# Patient Record
Sex: FEMALE | HISPANIC OR LATINO | Employment: UNEMPLOYED | URBAN - METROPOLITAN AREA
[De-identification: names, ages, dates, MRNs, and addresses within clinical notes are randomized per-mention and may not be internally consistent; named-entity substitution may affect disease eponyms.]

---

## 2018-04-04 ENCOUNTER — RECORDS - HEALTHEAST (OUTPATIENT)
Dept: LAB | Facility: HOSPITAL | Age: 28
End: 2018-04-04

## 2018-04-04 LAB
ALBUMIN SERPL-MCNC: 4.2 G/DL (ref 3.5–5)
ALP SERPL-CCNC: 50 U/L (ref 45–120)
ALT SERPL W P-5'-P-CCNC: 20 U/L (ref 0–45)
AST SERPL W P-5'-P-CCNC: 16 U/L (ref 0–40)
BILIRUB DIRECT SERPL-MCNC: 0.2 MG/DL
BILIRUB SERPL-MCNC: 0.5 MG/DL (ref 0–1)
PROT SERPL-MCNC: 8 G/DL (ref 6–8)

## 2019-03-26 ENCOUNTER — AMBULATORY - HEALTHEAST (OUTPATIENT)
Dept: ADMINISTRATIVE | Facility: CLINIC | Age: 29
End: 2019-03-26

## 2019-03-26 DIAGNOSIS — R09.81 NASAL CONGESTION: ICD-10-CM

## 2019-04-10 ENCOUNTER — OFFICE VISIT - HEALTHEAST (OUTPATIENT)
Dept: ALLERGY | Facility: CLINIC | Age: 29
End: 2019-04-10

## 2019-04-10 DIAGNOSIS — J30.89 NON-SEASONAL ALLERGIC RHINITIS DUE TO OTHER ALLERGIC TRIGGER: ICD-10-CM

## 2019-04-10 RX ORDER — HYDROXYZINE HYDROCHLORIDE 25 MG/1
25-50 TABLET, FILM COATED ORAL
Status: SHIPPED | COMMUNITY
Start: 2019-03-20

## 2019-04-10 RX ORDER — SUMATRIPTAN 25 MG/1
25 TABLET, FILM COATED ORAL
Status: SHIPPED | COMMUNITY
Start: 2019-04-10

## 2019-04-10 ASSESSMENT — MIFFLIN-ST. JEOR: SCORE: 1349.69

## 2019-04-17 ENCOUNTER — COMMUNICATION - HEALTHEAST (OUTPATIENT)
Dept: ALLERGY | Facility: CLINIC | Age: 29
End: 2019-04-17

## 2019-04-18 ENCOUNTER — RECORDS - HEALTHEAST (OUTPATIENT)
Dept: ADMINISTRATIVE | Facility: OTHER | Age: 29
End: 2019-04-18

## 2020-08-18 PROBLEM — G43.109 MIGRAINE WITH AURA: Status: ACTIVE | Noted: 2020-08-18

## 2020-08-18 PROBLEM — F41.1 GENERALIZED ANXIETY DISORDER: Status: ACTIVE | Noted: 2020-01-12

## 2021-05-27 NOTE — TELEPHONE ENCOUNTER
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 She is having to miss work, and she can't sleep. She is wondering what can be done about this. Would you be able to give her a call back at [phone]? Thank you.

## 2021-05-27 NOTE — PROGRESS NOTES
Assessment:    Allergic rhinitis with specific sensitivity to dust mite    Plan:    Dust mite avoidance measures  Fluticasone 2 sprays each nostril daily  Antihistamine such as Claritin or Benadryl as needed  Follow-up in allergy clinic as needed.  ____________________________________________________________________________     Patient comes in today for allergy evaluation.  She reports sneezing, itchy watery eyes, rhinorrhea-and and sometimes it affects her hearing.  She does get migraine headaches and she feels that her allergies trigger this.  Her symptoms are year-round but worse seasonally in the spring and fall.  She is also suspicious of dogs as a trigger.  She is trialed Claritin and cetirizine but they do not seem to help very much.  She uses Benadryl at night occasionally.  No history of asthma or wheezing.  She does have a history of eczema.    Review of symptoms:  As above, otherwise negative    Past medical history: Migraine headaches, anxiety    Allergies: No known allergies to  latex, foods or hymenoptera venom.  Reported drug allergy to ciprofloxacin, codeine and oxycodone.    Family history: Siblings and son with asthma history.  No known member the family with allergy.    Social history: Currently is lived in the same house for 3 years.  No pets in the home.  No visible water seepage or mold in the home.  She does smoke cigarettes off and on.    Medications: reviewed in chart    Physical Exam:  General:  Alert and in no apparent distress.  Eyes:  Sclera clear.  Ears: TMs translucent grey with bony landmarks visible. Nose: Pale, boggy mucosal membranes.  Throat: Pink, moist.  No lesions.  Neck: Supple.  No lymphadenopathy.  Lungs: CTA.  CV: Regular rate and rhythm. Extremities: Well perfused.  No clubbing or cyanosis. Skin: No rash    Last Percutaneous Allergy Test Results  Trees  Fransico, White  1:20 H  (W/F in mm): 0 (04/10/19 1112)  Birch Mix 1:20 H (W/F in mm): 0 (04/10/19 1112)  [place], Common  1:20 H (W/F in mm): 0 (04/10/19 1112)  Elm, American 1:20 H (W/F in mm): 0 (04/10/19 1112)  [place], Shagbark 1:20 H (W/F in mm): 0 (04/10/19 1112)  Maple, Hard/Sugar 1:20 H (W/F in mm): 0 (04/10/19 1112)  [place] Mix 1:20 H (W/F in mm): 0 (04/10/19 1112)  [place], Red 1:20 H (W/F in mm): 0 (04/10/19 1112)  [place], American 1:20 H (W/F in mm): 0 (04/10/19 1112)  [place] Tree 1:20 H (W/F in mm): 0 (04/10/19 1112)  Dust Mites  D. Pteronyssinus Mite 30,000 AU/ML H (W/F in mm): 7/9 (04/10/19 1112)  D. Farinae Mite 30,000 AU/ML H (W/F in mm: 5/7 (04/10/19 1112)  Grasses  Grass Mix #4 10,000 BAU/ML H: 0 (04/10/19 1112)  Ibrahima Grass 1:20 H (W/F in mm): 0 (04/10/19 1112)  Cockroach  Cockroach Mix 1:10 H (W/F in mm): 0 (04/10/19 1112)  Molds/Fungi  Alternaria Tenuis 1:10 H (W/F in mm): 0 (04/10/19 1112)  Aspergillus Fumigatus 1:10 H (W/F in mm): 0 (04/10/19 1112)  Homodendrum Cladosporioides 1:10 H (W/F in mm): 0 (04/10/19 1112)  Penicillin Notatum 1:10 H (W/F in mm): 0 (04/10/19 1112)  Epicoccum 1:10 H (W/F in mm): 0 (04/10/19 1112)  Weeds  Ragweed, Short 1:20 H (W/F in mm): 0 (04/10/19 1112)  Dock, Sorrel 1:20 H (W/F in mm): 0 (04/10/19 1112)  Lamb's Quarter 1:20 H (W/F in mm): 0 (04/10/19 1112)  Pigweed, Rough Red Root 1:20 H  (W/F in mm): 0 (04/10/19 1112)  Plantain, English 1:20 H  (W/F in mm): 0 (04/10/19 1112)  Sagebrush, Mugwort 1:20 H  (W/F in mm): 0 (04/10/19 1112)  Animal  Cat 10,000 BAU/ML H (W/F in mm): 0 (04/10/19 1112)  Dog 1:10 H (W/F in mm): 0 (04/10/19 1112)  Controls  Device Type: QUINTIP (04/10/19 1112)  Neg. control: 50% Glycerine/Saline H (W/F in mm): 0/0 (04/10/19 1112)  Pos. control: Histamine 6mg/ML (W/F in mms): 5/9 (04/10/19 1112)     Patient seen upon request of Dr. Patel for allergy evaluation

## 2021-05-27 NOTE — PATIENT INSTRUCTIONS - HE
Assessment:    Allergic rhinitis with specific sensitivity to dust mite    Plan:    Dust mite avoidance measures  Fluticasone 2 sprays each nostril daily  Antihistamine such as Claritin or Benadryl as needed  Follow-up in allergy clinic as needed.

## 2021-06-02 VITALS — HEIGHT: 65 IN | WEIGHT: 138.4 LBS | BODY MASS INDEX: 23.06 KG/M2

## 2021-06-19 NOTE — LETTER
Letter by Zia Rodriguez MD at      Author: Zia Rodriguez MD Service: -- Author Type: --    Filed:  Encounter Date: 4/10/2019 Status: (Other)           [facility]    [facility]  [address] [place] [address]  [phone]    [facility]  [address], [address]  [place] [address]  [phone]    04/11/19    Provider, No Primary Care  No address on file    Patient: Pilo Jaeger  MR Number: [id number]  YOB: 1990  Date of Visit: 4/10/2019      Dear Dr. Patel  Thank you for referring Pilo Jaeger to me for evaluation.  Please see attached visit note.  If you have questions, please do not hesitate to contact me.      Sincerely,    Zia Rodriguez MD  [facility]  [phone]  [email or web address]                    [email or web address]              Assessment:    Allergic rhinitis with specific sensitivity to dust mite    Plan:    Dust mite avoidance measures  Fluticasone 2 sprays each nostril daily  Antihistamine such as Claritin or Benadryl as needed  Follow-up in allergy clinic as needed.  ____________________________________________________________________________     Patient comes in today for allergy evaluation.  She reports sneezing, itchy watery eyes, rhinorrhea-and and sometimes it affects her hearing.  She does get migraine headaches and she feels that her allergies trigger this.  Her symptoms are year-round but worse seasonally in the spring and fall.  She is also suspicious of dogs as a trigger.  She is trialed Claritin and cetirizine but they do not seem to help very much.  She uses Benadryl at night occasionally.  No history of asthma or wheezing.  She does have a history of eczema.    Review of symptoms:  As above, otherwise negative    Past medical history: Migraine headaches, anxiety    Allergies: No known allergies to  latex, foods or hymenoptera venom.  Reported drug allergy to  ciprofloxacin, codeine and oxycodone.    Family history: Siblings and son with asthma history.  No known member the family with allergy.    Social history: Currently is lived in the same house for 3 years.  No pets in the home.  No visible water seepage or mold in the home.  She does smoke cigarettes off and on.    Medications: reviewed in chart    Physical Exam:  General:  Alert and in no apparent distress.  Eyes:  Sclera clear.  Ears: TMs translucent grey with bony landmarks visible. Nose: Pale, boggy mucosal membranes.  Throat: Pink, moist.  No lesions.  Neck: Supple.  No lymphadenopathy.  Lungs: CTA.  CV: Regular rate and rhythm. Extremities: Well perfused.  No clubbing or cyanosis. Skin: No rash    Last Percutaneous Allergy Test Results  Trees  Fransico, White  1:20 H  (W/F in mm): 0 (04/10/19 1112)  Birch Mix 1:20 H (W/F in mm): 0 (04/10/19 1112)  [place], Common 1:20 H (W/F in mm): 0 (04/10/19 1112)  Elm, American 1:20 H (W/F in mm): 0 (04/10/19 1112)  [place], Shagbark 1:20 H (W/F in mm): 0 (04/10/19 1112)  Maple, Hard/Sugar 1:20 H (W/F in mm): 0 (04/10/19 1112)  [place] Mix 1:20 H (W/F in mm): 0 (04/10/19 1112)  [place], Red 1:20 H (W/F in mm): 0 (04/10/19 1112)  [place], American 1:20 H (W/F in mm): 0 (04/10/19 1112)  [place] Tree 1:20 H (W/F in mm): 0 (04/10/19 1112)  Dust Mites  D. Pteronyssinus Mite 30,000 AU/ML H (W/F in mm): 7/9 (04/10/19 1112)  D. Farinae Mite 30,000 AU/ML H (W/F in mm: 5/7 (04/10/19 1112)  Grasses  Grass Mix #4 10,000 BAU/ML H: 0 (04/10/19 1112)  Ibrahima Grass 1:20 H (W/F in mm): 0 (04/10/19 1112)  Cockroach  Cockroach Mix 1:10 H (W/F in mm): 0 (04/10/19 1112)  Molds/Fungi  Alternaria Tenuis 1:10 H (W/F in mm): 0 (04/10/19 1112)  Aspergillus Fumigatus 1:10 H (W/F in mm): 0 (04/10/19 1112)  Homodendrum Cladosporioides 1:10 H (W/F in mm): 0 (04/10/19 1112)  Penicillin Notatum 1:10 H (W/F in mm): 0 (04/10/19 1112)  Epicoccum 1:10 H (W/F in mm): 0 (04/10/19 1112)  Weeds  Ragweed, Short 1:20  H (W/F in mm): 0 (04/10/19 1112)  Dock, Sorrel 1:20 H (W/F in mm): 0 (04/10/19 1112)  Lamb's Quarter 1:20 H (W/F in mm): 0 (04/10/19 1112)  Pigweed, Rough Red Root 1:20 H  (W/F in mm): 0 (04/10/19 1112)  Plantain, English 1:20 H  (W/F in mm): 0 (04/10/19 1112)  Sagebrush, Mugwort 1:20 H  (W/F in mm): 0 (04/10/19 1112)  Animal  Cat 10,000 BAU/ML H (W/F in mm): 0 (04/10/19 1112)  Dog 1:10 H (W/F in mm): 0 (04/10/19 1112)  Controls  Device Type: QUINTIP (04/10/19 1112)  Neg. control: 50% Glycerine/Saline H (W/F in mm): 0/0 (04/10/19 1112)  Pos. control: Histamine 6mg/ML (W/F in mms): 5/9 (04/10/19 1112)     Patient seen upon request of Dr. Patel for allergy evaluation

## 2021-09-20 ENCOUNTER — HOSPITAL ENCOUNTER (EMERGENCY)
Facility: HOSPITAL | Age: 31
Discharge: HOME | End: 2021-09-20
Attending: EMERGENCY MEDICINE | Admitting: EMERGENCY MEDICINE
Payer: COMMERCIAL

## 2021-09-20 VITALS
OXYGEN SATURATION: 100 % | TEMPERATURE: 97.8 F | HEART RATE: 79 BPM | HEIGHT: 65 IN | BODY MASS INDEX: 22.33 KG/M2 | RESPIRATION RATE: 20 BRPM | DIASTOLIC BLOOD PRESSURE: 93 MMHG | SYSTOLIC BLOOD PRESSURE: 129 MMHG | WEIGHT: 134 LBS

## 2021-09-20 DIAGNOSIS — N30.00 ACUTE CYSTITIS WITHOUT HEMATURIA: ICD-10-CM

## 2021-09-20 LAB
ALBUMIN UR-MCNC: 20 MG/DL
APPEARANCE UR: ABNORMAL
BILIRUB UR QL STRIP: NEGATIVE
COLOR UR AUTO: YELLOW
GLUCOSE UR STRIP-MCNC: NEGATIVE MG/DL
HCG UR QL: NEGATIVE
HCG UR QL: NEGATIVE
HGB UR QL STRIP: NEGATIVE
INTERNAL QC OK POCT: NORMAL
KETONES UR STRIP-MCNC: NEGATIVE MG/DL
LEUKOCYTE ESTERASE UR QL STRIP: ABNORMAL
MUCOUS THREADS #/AREA URNS LPF: PRESENT /LPF
NITRATE UR QL: NEGATIVE
PH UR STRIP: 7 [PH] (ref 5–7)
RBC URINE: <1 /HPF
SP GR UR STRIP: 1.02 (ref 1–1.03)
SQUAMOUS EPITHELIAL: <1 /HPF
UROBILINOGEN UR STRIP-MCNC: 2 MG/DL
WBC URINE: 77 /HPF

## 2021-09-20 PROCEDURE — 81025 URINE PREGNANCY TEST: CPT | Performed by: EMERGENCY MEDICINE

## 2021-09-20 PROCEDURE — 87086 URINE CULTURE/COLONY COUNT: CPT | Performed by: EMERGENCY MEDICINE

## 2021-09-20 PROCEDURE — 81001 URINALYSIS AUTO W/SCOPE: CPT | Performed by: EMERGENCY MEDICINE

## 2021-09-20 PROCEDURE — 99283 EMERGENCY DEPT VISIT LOW MDM: CPT

## 2021-09-20 RX ORDER — NITROFURANTOIN 25; 75 MG/1; MG/1
100 CAPSULE ORAL 2 TIMES DAILY
Qty: 10 CAPSULE | Refills: 0 | Status: SHIPPED | OUTPATIENT
Start: 2021-09-20 | End: 2021-09-25

## 2021-09-20 ASSESSMENT — ENCOUNTER SYMPTOMS
DYSURIA: 0
FREQUENCY: 1
BACK PAIN: 0
FEVER: 0

## 2021-09-20 ASSESSMENT — MIFFLIN-ST. JEOR: SCORE: 1328.7

## 2021-09-20 NOTE — ED TRIAGE NOTES
Patient arrives via private vehicle, coming from home with complaints of urinary frequency since last Saturday.

## 2021-09-21 NOTE — ED PROVIDER NOTES
EMERGENCY DEPARTMENT ENCOUNTER     NAME: Pilo Jaeger   AGE: 31 year old female   YOB: 1990   MRN: [id number]   EVALUATION DATE & TIME: No admission date for patient encounter.   PCP: Johanne Olivares     Chief Complaint   Patient presents with     Urinary Frequency   :    FINAL IMPRESSION       1. Acute cystitis without hematuria           ED COURSE & MEDICAL DECISION MAKING      Pertinent Labs & Imaging studies reviewed. (See chart for details)   31 year old female  presents to the Emergency Department for evaluation of urinary hesitancy, incomplete emptying, concern for UTI. Initial Vitals Reviewed. Initial exam notable for well-appearing patient in no acute distress.  She denies any signs with suggest pyelonephritis, any concern about STD, vaginal discharge.  Urine pregnancy was obtained and is negative.  Urinalysis is consistent with UTI especially in the setting of a symptomatic patient.  We discussed instructions for home care and she was discharged with a prescription for Macrobid and return precautions.        7:07 PM I met the patient and performed my initial interview and exam.     At the conclusion of the encounter I discussed the results of all of the tests and the disposition. The questions were answered. The patient or family acknowledged understanding and was agreeable with the care plan.         MEDICATIONS GIVEN IN THE EMERGENCY:   Medications - No data to display   NEW PRESCRIPTIONS STARTED AT TODAY'S ER VISIT   New Prescriptions    NITROFURANTOIN MACROCRYSTAL-MONOHYDRATE (MACROBID) 100 MG CAPSULE    Take 1 capsule (100 mg) by mouth 2 times daily for 5 days     ================================================================   HISTORY OF PRESENT ILLNESS       Patient information was obtained from: Patient   Use of Intrepreter: N/A   Pilo Jaeger is a 31 year old female with history of tobacco use disorder who presents to the ED via walk in for evaluation of urinary  frequency.    Patient reports urinary frequency since 09/18 (2 days ago). She states she thinks she has a UTI as she notes a history of UTIs and that her current symptoms feel similar. Of note, patient has not been sexually active for the past three months. Denies back pain, fever, dysuria, vaginal discharge, or any other complaints or concerns at this time.     ================================================================    REVIEW OF SYSTEMS       Review of Systems   Constitutional: Negative for fever.   Genitourinary: Positive for frequency. Negative for dysuria and vaginal discharge.   Musculoskeletal: Negative for back pain.   All other systems reviewed and are negative.      PAST HISTORY     PAST MEDICAL HISTORY:   History reviewed. No pertinent past medical history.   PAST SURGICAL HISTORY:   Past Surgical History:   Procedure Laterality Date     BREAST EXCISIONAL BIOPSY Right       CURRENT MEDICATIONS:   nitroFURantoin macrocrystal-monohydrate (MACROBID) 100 MG capsule  hydrOXYzine HCl (ATARAX) 25 MG tablet  ibuprofen (ADVIL,MOTRIN) 600 MG tablet  SUMAtriptan (IMITREX) 25 MG tablet      ALLERGIES:   Allergies   Allergen Reactions     Codeine-Guaifenesin      Other reaction(s): *Unknown     Metronidazole      Other reaction(s): *Unknown     Penicillins Hives     Codeine Rash     Imidazole Antifungals Rash     Oxycodone Anxiety, Other (See Comments) and Rash     Quinolones Headache, Nausea and Rash      FAMILY HISTORY:   History reviewed. No pertinent family history.   SOCIAL HISTORY:   Social History     Socioeconomic History     Marital status: Single     Spouse name: Not on file     Number of children: Not on file     Years of education: Not on file     Highest education level: Not on file   Occupational History     Not on file   Tobacco Use     Smoking status: Never Smoker   Substance and Sexual Activity     Alcohol use: Yes     Comment: Alcoholic Drinks/day: occasional use     Drug use: No     Sexual  "activity: Not on file   Other Topics Concern     Not on file   Social History Narrative     Not on file     Social Determinants of Health     Financial Resource Strain:      Difficulty of Paying Living Expenses:    Food Insecurity:      Worried About Running Out of Food in the Last Year:      Ran Out of Food in the Last Year:    Transportation Needs:      Lack of Transportation (Medical):      Lack of Transportation (Non-Medical):    Physical Activity:      Days of Exercise per Week:      Minutes of Exercise per Session:    Stress:      Feeling of Stress :    Social Connections:      Frequency of Communication with Friends and Family:      Frequency of Social Gatherings with Friends and Family:      Attends [employer] Services:      Active Member of Clubs or Organizations:      Attends Club or Organization Meetings:      Marital Status:    Intimate Partner Violence:      Fear of Current or Ex-Partner:      Emotionally Abused:      Physically Abused:      Sexually Abused:         VITALS  Patient Vitals for the past 24 hrs:   BP Temp Temp src Pulse Resp SpO2 Height Weight   09/20/21 1854 (!) 129/93 97.8  F (36.6  C) Oral 79 20 100 % 1.651 m (5' 5\") 60.8 kg (134 lb)        ================================================================    PHYSICAL EXAM     VITAL SIGNS: BP (!) 129/93   Pulse 79   Temp 97.8  F (36.6  C) (Oral)   Resp 20   Ht 1.651 m (5' 5\")   Wt 60.8 kg (134 lb)   SpO2 100%   BMI 22.30 kg/m     Constitutional:  Awake, no acute distress   HENT:  Atraumatic, oropharynx without exudate or erythema, membranes moist  Lymph:  No adenopathy  Eyes: EOM intact, PERRL, no injection  Neck: Supple  Respiratory:  Clear to auscultation bilaterally, no wheezes or crackles   Cardiovascular:  Regular rate and rhythm, single S1 and S2   GI:  Soft, nontender, nondistended, no rebound or guarding   Musculoskeletal:  Moves all extremities, no lower extremity edema, no deformities    Skin:  Warm, dry  Neurologic:  " Alert and oriented x3, no focal deficits noted       ================================================================  LAB       All pertinent labs reviewed and interpreted.   Labs Ordered and Resulted from Time of ED Arrival Up to the Time of Departure from the ED   ROUTINE UA WITH MICROSCOPIC REFLEX TO CULTURE - Abnormal; Notable for the following components:       Result Value    Appearance Urine Turbid (*)     Protein Albumin Urine 20  (*)     Urobilinogen Urine 2.0 (*)     Leukocyte Esterase Urine 500 Seth/uL (*)     Mucus Urine Present (*)     WBC Urine 77 (*)     All other components within normal limits    Narrative:     Urine Culture ordered based on laboratory criteria   HCG QUALITATIVE URINE POCT - Normal   HCG QUALITATIVE URINE   URINE CULTURE        ===============================================================  RADIOLOGY       Reviewed all pertinent imaging. Please see official radiology report.   No orders to display         ================================================================  EKG         I have independently reviewed and interpreted the EKG(s) documented above.     ================================================================  PROCEDURES         I, Sharon Cisse, am serving as a scribe to document services personally performed by Dr. Johnson based on my observation and the provider's statements to me. I, Danette Johnson MD attest that Sharon Cisse is acting in a scribe capacity, has observed my performance of the services and has documented them in accordance with my direction.     Danette Johnson M.D.   Emergency Medicine   [facility] EMERGENCY DEPARTMENT  [facility] [address] [phone]  Dept: [phone]      Danette Johnson MD  09/20/21 2019

## 2021-09-22 LAB — BACTERIA UR CULT: ABNORMAL

## 2023-01-02 ENCOUNTER — HOSPITAL ENCOUNTER (EMERGENCY)
Facility: HOSPITAL | Age: 33
Discharge: HOME | End: 2023-01-02
Admitting: PHYSICIAN ASSISTANT
Payer: COMMERCIAL

## 2023-01-02 VITALS
HEART RATE: 68 BPM | RESPIRATION RATE: 16 BRPM | DIASTOLIC BLOOD PRESSURE: 77 MMHG | WEIGHT: 15 LBS | BODY MASS INDEX: 2.5 KG/M2 | SYSTOLIC BLOOD PRESSURE: 133 MMHG | TEMPERATURE: 98.1 F | OXYGEN SATURATION: 100 %

## 2023-01-02 DIAGNOSIS — K60.2 ANAL FISSURE: ICD-10-CM

## 2023-01-02 PROBLEM — R12 HEARTBURN DURING PREGNANCY IN THIRD TRIMESTER: Status: ACTIVE | Noted: 2020-03-22

## 2023-01-02 PROBLEM — O47.03 PRETERM UTERINE CONTRACTIONS IN THIRD TRIMESTER, ANTEPARTUM: Status: ACTIVE | Noted: 2020-03-06

## 2023-01-02 PROBLEM — Z22.322 MRSA (METHICILLIN RESISTANT STAPH AUREUS) CULTURE POSITIVE: Status: ACTIVE | Noted: 2018-01-24

## 2023-01-02 PROBLEM — Z34.83 MULTIGRAVIDA IN THIRD TRIMESTER: Status: ACTIVE | Noted: 2020-01-12

## 2023-01-02 PROBLEM — O03.4 RETAINED PRODUCTS OF CONCEPTION AFTER MISCARRIAGE: Status: ACTIVE | Noted: 2018-06-08

## 2023-01-02 PROBLEM — O26.893 HEARTBURN DURING PREGNANCY IN THIRD TRIMESTER: Status: ACTIVE | Noted: 2020-03-22

## 2023-01-02 LAB
ANION GAP SERPL CALCULATED.3IONS-SCNC: 11 MMOL/L (ref 7–15)
BUN SERPL-MCNC: 13.1 MG/DL (ref 6–20)
CALCIUM SERPL-MCNC: 10.1 MG/DL (ref 8.6–10)
CHLORIDE SERPL-SCNC: 96 MMOL/L (ref 98–107)
CREAT SERPL-MCNC: 0.76 MG/DL (ref 0.51–0.95)
DEPRECATED HCO3 PLAS-SCNC: 25 MMOL/L (ref 22–29)
ERYTHROCYTE [DISTWIDTH] IN BLOOD BY AUTOMATED COUNT: 12.4 % (ref 10–15)
GFR SERPL CREATININE-BSD FRML MDRD: >90 ML/MIN/1.73M2
GLUCOSE SERPL-MCNC: 99 MG/DL (ref 70–99)
HCT VFR BLD AUTO: 46.9 % (ref 35–47)
HGB BLD-MCNC: 15.8 G/DL (ref 11.7–15.7)
INR PPP: 0.97 (ref 0.85–1.15)
MCH RBC QN AUTO: 30.7 PG (ref 26.5–33)
MCHC RBC AUTO-ENTMCNC: 33.7 G/DL (ref 31.5–36.5)
MCV RBC AUTO: 91 FL (ref 78–100)
PLATELET # BLD AUTO: 249 10E3/UL (ref 150–450)
POTASSIUM SERPL-SCNC: 4.5 MMOL/L (ref 3.4–5.3)
RBC # BLD AUTO: 5.15 10E6/UL (ref 3.8–5.2)
SODIUM SERPL-SCNC: 132 MMOL/L (ref 136–145)
WBC # BLD AUTO: 9.1 10E3/UL (ref 4–11)

## 2023-01-02 PROCEDURE — 85027 COMPLETE CBC AUTOMATED: CPT | Performed by: EMERGENCY MEDICINE

## 2023-01-02 PROCEDURE — 99283 EMERGENCY DEPT VISIT LOW MDM: CPT

## 2023-01-02 PROCEDURE — 80048 BASIC METABOLIC PNL TOTAL CA: CPT | Performed by: EMERGENCY MEDICINE

## 2023-01-02 PROCEDURE — 85610 PROTHROMBIN TIME: CPT | Performed by: EMERGENCY MEDICINE

## 2023-01-02 PROCEDURE — 36415 COLL VENOUS BLD VENIPUNCTURE: CPT | Performed by: EMERGENCY MEDICINE

## 2023-01-02 RX ORDER — LIDOCAINE HYDROCHLORIDE 20 MG/ML
JELLY TOPICAL 2 TIMES DAILY PRN
Qty: 5 ML | Refills: 0 | Status: SHIPPED | OUTPATIENT
Start: 2023-01-02

## 2023-01-02 ASSESSMENT — ACTIVITIES OF DAILY LIVING (ADL): ADLS_ACUITY_SCORE: 35

## 2023-01-02 NOTE — DISCHARGE INSTRUCTIONS
There is a very small tear in the skin around your anus that is likely the cause of your bleeding and pain.      The best management for this is keeping your stools soft and avoiding straining. We did prescribe a topical analgesic to help with pain however.      Follow up as scheduled on Thursday for your colonoscopy.

## 2023-01-02 NOTE — ED PROVIDER NOTES
ED PROVIDER NOTE    EMERGENCY DEPARTMENT ENCOUNTER      NAME: Pilo Jaeger  AGE: 32 year old female  YOB: 1990  MRN: [id number]  EVALUATION DATE & TIME: No admission date for patient encounter.    PCP: System, Provider Not In    ED PROVIDER: Mitra Soto PA-C      Chief Complaint   Patient presents with     Rectal Bleeding         FINAL IMPRESSION:  1. Anal fissure          MEDICAL DECISION MAKING:    Pertinent Labs & Imaging studies reviewed. (See chart for details)  32 year old female presents to the Emergency Department for evaluation of rectal bleeding. Has had intermittent bleeding for last month.  Has f/u scheduled with GI for colonscopy in 2 days as her mother has stage IV colon ca.  Today she had more bleeding and rectal pain prompting her visit. No systemic signs of anemia.    Vitals stable here. She appears well, nontoxic.  Abdomen soft, NT. Considered malignancy but symptoms seem more c/w hemorrhoidal bleeding or fissure.  No abdominal tenderness. No imaging indicated today especially in setting of colonoscopy scheduled in 2 days.  Hgb checked and was stable at 15.8.  No white count.      On rectal exam she did have findings of an anal fissure. This is c/w symptoms. We discussed hydration, avoiding straining and will prescribe some lidocaine jelly to help with pain.  She has close f/u.  Signs and symptoms that should prompt return to the ER were explained. Patient understood and was comfortable with plan.       At the conclusion of the encounter I discussed the results of all of the tests and the disposition. The questions were answered. The patient or family acknowledged understanding and was agreeable with the care plan.               ED COURSE  1:57 PM Met and evaluated patient. Discussed ED plan. PPE worn including N95 mask, surgical gloves.  2:40 PM I performed a rectal exam chaperoned by a female nurse present.      MEDICATIONS GIVEN IN THE EMERGENCY:  Medications - No data to  "display    NEW PRESCRIPTIONS STARTED AT TODAY'S ER VISIT  Discharge Medication List as of 1/2/2023  3:03 PM      START taking these medications    Details   lidocaine (XYLOCAINE) 2 % external gel Apply topically 2 times daily as needed for moderate pain (4-6)Disp-5 mL, R-0Local Print                =================================================================    HPI    Patient information was obtained from: Patient    Use of : None        Pilo Jaeger is a 32 year old female with no recorded pertinent medical history who presents by walk in for the evaluation of rectal bleeding. Patient reports she has been having rectal bleeding and hematochezia for the past month. Initially, patient states she only noticed small amount of bleeding when wiping. This then progressed to having some blood in her stool and the toilet bowl. On 12/30 (3 days ago), patient reports she had some increased rectal bleeding, but was not having pain at that time. Today, she reports having increased hematochezia and rectal bleeding and was having blood \"dripping\" from her rectum after she had her bowel movement and states she had \"a lot\" of bleeding. Patient also reported severe rectal pain which caused her to cry while she was on the toilet. Patient continues to have rectal pain in the ED, though more mild. Patient states she noticed some light pink blood while she was going to the bathroom in the ED.    Patient reports to the ED today concerned for cancer as her mother has a history of stage IV colon cancer. Patient states she is scheduled to have a colonoscopy on 01/05. No other reported complaints at this time.    REVIEW OF SYSTEMS   See HPI, otherwise all other systems reviewed and are negative    PAST MEDICAL HISTORY:  History reviewed. No pertinent past medical history.    PAST SURGICAL HISTORY:  Past Surgical History:   Procedure Laterality Date     BREAST EXCISIONAL BIOPSY Right            CURRENT MEDICATIONS:    No " current facility-administered medications for this encounter.    Current Outpatient Medications:      hydrOXYzine HCl (ATARAX) 25 MG tablet, [HYDROXYZINE HCL (ATARAX) 25 MG TABLET] Take 25-50 mg by mouth., Disp: , Rfl:      ibuprofen (ADVIL,MOTRIN) 600 MG tablet, [IBUPROFEN (ADVIL,MOTRIN) 600 MG TABLET] Take 600 mg by mouth every 6 (six) hours as needed for pain., Disp: , Rfl:      SUMAtriptan (IMITREX) 25 MG tablet, [SUMATRIPTAN (IMITREX) 25 MG TABLET] Take 25 mg by mouth every 2 (two) hours as needed for migraine., Disp: , Rfl:     ALLERGIES:  Allergies   Allergen Reactions     Codeine-Guaifenesin      Other reaction(s): *Unknown     Metronidazole      Other reaction(s): *Unknown     Penicillins Hives     Codeine Rash     Imidazole Antifungals Rash     Oxycodone Anxiety, Other (See Comments) and Rash     Quinolones Headache, Nausea and Rash       FAMILY HISTORY:  History reviewed. No pertinent family history.      VITALS:  Vitals:    01/02/23 1145   BP: 127/78   Pulse: 81   Resp: 16   Temp: 98.1  F (36.7  C)   TempSrc: Oral   SpO2: 98%   Weight: (!) 6.804 kg (15 lb)       PHYSICAL EXAM    General Appearance:  Alert, cooperative, no distress, appears stated age  HENT: Normocephalic without obvious deformity, atraumatic. Mucous membranes moist   Eyes: Conjunctiva clear, Lids normal. No discharge.   Respiratory: No distress. Lungs clear to ausculation bilaterally. No wheezes, rhonchi or stridor  Cardiovascular: Regular rate and rhythm, no murmur. Normal cap refill. No peripheral edema  GI: Abdomen soft, nontender, normal bowel sounds  Rectal: Small external hemorrhoids with a small anal fissure at 12 o'clock  : No CVA tenderness  Musculoskeletal: Moving all extremities. No gross deformities  Integument: Warm, dry, no rashes or lesions  Neurologic: Alert and orientated x3. No focal deficits.  Psych: Normal mood and affect        LAB:  Labs Ordered and Resulted from Time of ED Arrival to Time of ED Departure   CBC  WITH PLATELETS - Abnormal       Result Value    WBC Count 9.1      RBC Count 5.15      Hemoglobin 15.8 (*)     Hematocrit 46.9      MCV 91      MCH 30.7      MCHC 33.7      RDW 12.4      Platelet Count 249     BASIC METABOLIC PANEL - Abnormal    Sodium 132 (*)     Potassium 4.5      Chloride 96 (*)     Carbon Dioxide (CO2) 25      Anion Gap 11      Urea Nitrogen 13.1      Creatinine 0.76      Calcium 10.1 (*)     Glucose 99      GFR Estimate >90     INR - Normal    INR 0.97         RADIOLOGY:  No orders to display         I, Mathieu Lloyd, am serving as a scribe to document services personally performed by Mitra Soto PA-C based on my observation and the provider's statements to me. I, Mitra Soto PA-C attest that Mathieu Lloyd is acting in a scribe capacity, has observed my performance of the services and has documented them in accordance with my direction.    Mitra Soto PA-C   Emergency Medicine           Mitra Soto PA-C  01/04/23 1111

## 2023-01-02 NOTE — ED TRIAGE NOTES
Patient presents here for evaluation of intermittent rectal bleeding. Current episode occurred today. She notes rectal pain while evacuating a stool. States that afterward, she had a sense of dripping.

## 2023-01-02 NOTE — ED NOTES
ED Provider In Triage Note  [facility]  Encounter Date: Jan 2, 2023    No chief complaint on file.      Brief HPI:   Pilo Jaeger is a 32 year old female presenting to the Emergency Department with a chief complaint of blood in stool for the past month. No pain. Last week scheduled a colonoscopy was changed to soonest available appt (this coming Thursday).     Last episode was this morning, and it hurt her rectum. coming out. Blood continued to drip after stooling, mild residual pain.    Mother has colon cancer, so she is very concerned about this bleeding.    Brief Physical Exam:  /78   Pulse 81   Temp 98.1  F (36.7  C) (Oral)   Resp 16   Wt (!) 6.804 kg (15 lb)   SpO2 98%   BMI 2.50 kg/m    General: Non-toxic appearing  HEENT: Atraumatic  Resp: No respiratory distress  Abdomen: Non-peritoneal  Neuro: Alert, oriented, answers questions appropriately  Psych: Behavior appropriate    Plan Initiated in Triage:  Ddx includes: colitis, diverticulosis, rectal tear, hemorrhoids    PIT Dispo:   Return to lobby while awaiting workup and ED bed availability    Colby Mcintyre MD on 1/2/2023 at 11:47 AM    Patient was evaluated by the Physician in Triage due to a limitation of available rooms in the Emergency Department. A plan of care was discussed based on the information obtained on the initial evaluation and patient was consuled to return back to the Emergency Department lobby after this initial evalutaiton until results were obtained or a room became available in the Emergency Department. Patient was counseled not to leave prior to receiving the results of their workup.     Colby Mcintyre MD  [facility] EMERGENCY DEPARTMENT  [address] [place] [id number]  [phone]       Colby Mcintyre MD  01/02/23 [id number]       Colby Mcintyre MD  01/02/23 4962

## 2023-12-15 ENCOUNTER — HOSPITAL ENCOUNTER (EMERGENCY)
Facility: HOSPITAL | Age: 33
Discharge: HOME | End: 2023-12-15
Attending: EMERGENCY MEDICINE | Admitting: EMERGENCY MEDICINE
Payer: COMMERCIAL

## 2023-12-15 VITALS
WEIGHT: 151 LBS | BODY MASS INDEX: 25.78 KG/M2 | OXYGEN SATURATION: 98 % | TEMPERATURE: 98.2 F | DIASTOLIC BLOOD PRESSURE: 71 MMHG | HEART RATE: 62 BPM | RESPIRATION RATE: 18 BRPM | HEIGHT: 64 IN | SYSTOLIC BLOOD PRESSURE: 116 MMHG

## 2023-12-15 DIAGNOSIS — R07.9 CHEST PAIN, UNSPECIFIED TYPE: ICD-10-CM

## 2023-12-15 LAB
ALBUMIN SERPL BCG-MCNC: 5.1 G/DL (ref 3.5–5.2)
ALP SERPL-CCNC: 46 U/L (ref 40–150)
ALT SERPL W P-5'-P-CCNC: 17 U/L (ref 0–50)
ANION GAP SERPL CALCULATED.3IONS-SCNC: 14 MMOL/L (ref 7–15)
AST SERPL W P-5'-P-CCNC: 18 U/L (ref 0–45)
BASOPHILS # BLD AUTO: 0 10E3/UL (ref 0–0.2)
BASOPHILS NFR BLD AUTO: 0 %
BILIRUB SERPL-MCNC: 0.8 MG/DL
BUN SERPL-MCNC: 13.6 MG/DL (ref 6–20)
CALCIUM SERPL-MCNC: 9.4 MG/DL (ref 8.6–10)
CHLORIDE SERPL-SCNC: 104 MMOL/L (ref 98–107)
CREAT SERPL-MCNC: 0.83 MG/DL (ref 0.51–0.95)
D DIMER PPP FEU-MCNC: 0.4 UG/ML FEU (ref 0–0.5)
DEPRECATED HCO3 PLAS-SCNC: 23 MMOL/L (ref 22–29)
EGFRCR SERPLBLD CKD-EPI 2021: >90 ML/MIN/1.73M2
EOSINOPHIL # BLD AUTO: 0.1 10E3/UL (ref 0–0.7)
EOSINOPHIL NFR BLD AUTO: 1 %
ERYTHROCYTE [DISTWIDTH] IN BLOOD BY AUTOMATED COUNT: 12.8 % (ref 10–15)
GLUCOSE SERPL-MCNC: 95 MG/DL (ref 70–99)
HCT VFR BLD AUTO: 42.5 % (ref 35–47)
HGB BLD-MCNC: 14.6 G/DL (ref 11.7–15.7)
HOLD SPECIMEN: NORMAL
IMM GRANULOCYTES # BLD: 0 10E3/UL
IMM GRANULOCYTES NFR BLD: 1 %
LIPASE SERPL-CCNC: 12 U/L (ref 13–60)
LYMPHOCYTES # BLD AUTO: 2.4 10E3/UL (ref 0.8–5.3)
LYMPHOCYTES NFR BLD AUTO: 29 %
MCH RBC QN AUTO: 31.5 PG (ref 26.5–33)
MCHC RBC AUTO-ENTMCNC: 34.4 G/DL (ref 31.5–36.5)
MCV RBC AUTO: 92 FL (ref 78–100)
MONOCYTES # BLD AUTO: 0.5 10E3/UL (ref 0–1.3)
MONOCYTES NFR BLD AUTO: 6 %
NEUTROPHILS # BLD AUTO: 5.1 10E3/UL (ref 1.6–8.3)
NEUTROPHILS NFR BLD AUTO: 63 %
NRBC # BLD AUTO: 0 10E3/UL
NRBC BLD AUTO-RTO: 0 /100
PLATELET # BLD AUTO: 224 10E3/UL (ref 150–450)
POTASSIUM SERPL-SCNC: 3.7 MMOL/L (ref 3.4–5.3)
PROT SERPL-MCNC: 7.6 G/DL (ref 6.4–8.3)
RBC # BLD AUTO: 4.63 10E6/UL (ref 3.8–5.2)
SODIUM SERPL-SCNC: 141 MMOL/L (ref 135–145)
TROPONIN T SERPL HS-MCNC: <6 NG/L
WBC # BLD AUTO: 8.1 10E3/UL (ref 4–11)

## 2023-12-15 PROCEDURE — 250N000013 HC RX MED GY IP 250 OP 250 PS 637: Performed by: EMERGENCY MEDICINE

## 2023-12-15 PROCEDURE — 36415 COLL VENOUS BLD VENIPUNCTURE: CPT | Performed by: EMERGENCY MEDICINE

## 2023-12-15 PROCEDURE — 80053 COMPREHEN METABOLIC PANEL: CPT | Performed by: EMERGENCY MEDICINE

## 2023-12-15 PROCEDURE — 250N000009 HC RX 250: Performed by: EMERGENCY MEDICINE

## 2023-12-15 PROCEDURE — 85025 COMPLETE CBC W/AUTO DIFF WBC: CPT | Performed by: EMERGENCY MEDICINE

## 2023-12-15 PROCEDURE — 93005 ELECTROCARDIOGRAM TRACING: CPT | Performed by: EMERGENCY MEDICINE

## 2023-12-15 PROCEDURE — 85004 AUTOMATED DIFF WBC COUNT: CPT | Performed by: STUDENT IN AN ORGANIZED HEALTH CARE EDUCATION/TRAINING PROGRAM

## 2023-12-15 PROCEDURE — 36415 COLL VENOUS BLD VENIPUNCTURE: CPT | Performed by: STUDENT IN AN ORGANIZED HEALTH CARE EDUCATION/TRAINING PROGRAM

## 2023-12-15 PROCEDURE — 250N000011 HC RX IP 250 OP 636: Performed by: STUDENT IN AN ORGANIZED HEALTH CARE EDUCATION/TRAINING PROGRAM

## 2023-12-15 PROCEDURE — 250N000013 HC RX MED GY IP 250 OP 250 PS 637: Performed by: STUDENT IN AN ORGANIZED HEALTH CARE EDUCATION/TRAINING PROGRAM

## 2023-12-15 PROCEDURE — 84484 ASSAY OF TROPONIN QUANT: CPT | Performed by: EMERGENCY MEDICINE

## 2023-12-15 PROCEDURE — 93005 ELECTROCARDIOGRAM TRACING: CPT | Performed by: STUDENT IN AN ORGANIZED HEALTH CARE EDUCATION/TRAINING PROGRAM

## 2023-12-15 PROCEDURE — 99284 EMERGENCY DEPT VISIT MOD MDM: CPT

## 2023-12-15 PROCEDURE — 83690 ASSAY OF LIPASE: CPT | Performed by: EMERGENCY MEDICINE

## 2023-12-15 PROCEDURE — 85379 FIBRIN DEGRADATION QUANT: CPT | Performed by: EMERGENCY MEDICINE

## 2023-12-15 RX ORDER — ONDANSETRON 4 MG/1
4 TABLET, ORALLY DISINTEGRATING ORAL ONCE
Status: COMPLETED | OUTPATIENT
Start: 2023-12-15 | End: 2023-12-15

## 2023-12-15 RX ORDER — MAGNESIUM HYDROXIDE/ALUMINUM HYDROXICE/SIMETHICONE 120; 1200; 1200 MG/30ML; MG/30ML; MG/30ML
30 SUSPENSION ORAL ONCE
Status: COMPLETED | OUTPATIENT
Start: 2023-12-15 | End: 2023-12-15

## 2023-12-15 RX ORDER — ACETAMINOPHEN 325 MG/1
975 TABLET ORAL ONCE
Status: COMPLETED | OUTPATIENT
Start: 2023-12-15 | End: 2023-12-15

## 2023-12-15 RX ORDER — LIDOCAINE HYDROCHLORIDE 20 MG/ML
5 SOLUTION OROPHARYNGEAL ONCE
Status: COMPLETED | OUTPATIENT
Start: 2023-12-15 | End: 2023-12-15

## 2023-12-15 RX ADMIN — ALUMINUM HYDROXIDE, MAGNESIUM HYDROXIDE, AND SIMETHICONE 30 ML: 1200; 120; 1200 SUSPENSION ORAL at 18:41

## 2023-12-15 RX ADMIN — ACETAMINOPHEN 975 MG: 325 TABLET ORAL at 17:05

## 2023-12-15 RX ADMIN — LIDOCAINE HYDROCHLORIDE 5 ML: 20 SOLUTION ORAL; TOPICAL at 18:41

## 2023-12-15 RX ADMIN — ONDANSETRON 4 MG: 4 TABLET, ORALLY DISINTEGRATING ORAL at 17:07

## 2023-12-15 NOTE — ED TRIAGE NOTES
"The pt presents tearful and hyperventilating in triage. \"I thinks its my acid reflux, I need something for the acid reflux. \" Pt unable to sit still on the chair. Pt states Im having low back pain and chest pain. Pt states I have nausea and starts dry heaving in triage. Pt reports urinary frequency but  states that is not abnormal for her. Pt is tachycardic up to 150's in triage but when she calms down drops to 112. EKG ordered.     Triage Assessment (Adult)       Row Name 12/15/23 1629 12/15/23 1625       Triage Assessment    Airway WDL WDL WDL       Respiratory WDL    Respiratory WDL WDL WDL       Skin Circulation/Temperature WDL    Skin Circulation/Temperature WDL WDL WDL       Cardiac WDL    Cardiac WDL X;rhythm X    Pulse Rate & Regularity tachycardic --       Peripheral/Neurovascular WDL    Peripheral Neurovascular WDL WDL WDL       Cognitive/Neuro/Behavioral WDL    Cognitive/Neuro/Behavioral WDL WDL WDL                    "

## 2023-12-16 NOTE — ED PROVIDER NOTES
EMERGENCY DEPARTMENT NOTE     Name: Pilo Jaeger    Age/Sex: 33 year old female   MRN: [id number]   Evaluation Date & Time:  No admission date for patient encounter.    [facility]   ED Provider: Pierre Crowley D.O.       CHIEF COMPLAINT    Abdominal Pain; Back Pain; and Nausea, Vomiting, & Diarrhea       DIAGNOSIS & DISPOSITION/MEDICAL DECISION MAKING     1. Chest pain, unspecified type        Pilo Jaeger is a 33 year old female with relevant past history of generalized anxiety disorder, heartburn, migraines, and MRSA who presents to the emergency department for evaluation of abdominal pain, nausea, and vomiting.    Emergent causes of chest pain considered included but not limited to ACS, myocarditis/pericarditis, pulmonary embolism, thoracic aortic dissection, pneumothorax.  Patient does not have associated abdominal pain or history of vomiting to suggest Boerhaave syndrome  Also considered intra-abdominal process including cholecystitis or pancreatitis.    Medical Decision Making  Patient on exam and nontender abdomen.  Cardiopulmonary exam is normal.  EKG showed sinus tachycardia with nonspecific ST-T wave changes.  Troponin and D-dimer not elevated.  CBC, comprehensive metabolic profile, lipase within normal limits.  Patient received Maalox with viscous lidocaine with resolution of pain and tachycardia.  Patient has had difficulty with GERD and reflux in the past and her assessment is this is similar.  Had prior ultrasound in 2017 which showed no evidence of biliary tract disease.  Discussed other imaging including chest x-ray, CTA of the chest or ultrasound but patient is feeling improved and wished to be discharged.  Currently low suspicion for thoracic aortic dissection, pneumothorax, pericarditis or pericardial effusion or intra-abdominal process including symptomatic cholelithiasis and will recommend that the patient increase omeprazole to twice daily and use Maalox or Tums as  "needed.  She is recommended follow-up with her primary care physician early next week.  Patient will return if recurrent chest or abdominal discomfort despite these medications.  Interventions: Oral Maalox, viscous lidocaine  Discharge Vital Signs:/71   Pulse 62   Temp 98.2  F (36.8  C) (Temporal)   Resp 18   Ht 1.626 m (5' 4\")   Wt 68.5 kg (151 lb)   SpO2 98%   BMI 25.92 kg/m       DISPOSITION: Discharge.    Diagnostic studies:  Imaging:  No orders to display      Lab:  Labs Ordered and Resulted from Time of ED Arrival to Time of ED Departure   LIPASE - Abnormal       Result Value    Lipase 12 (*)    COMPREHENSIVE METABOLIC PANEL - Normal    Sodium 141      Potassium 3.7      Carbon Dioxide (CO2) 23      Anion Gap 14      Urea Nitrogen 13.6      Creatinine 0.83      GFR Estimate >90      Calcium 9.4      Chloride 104      Glucose 95      Alkaline Phosphatase 46      AST 18      ALT 17      Protein Total 7.6      Albumin 5.1      Bilirubin Total 0.8     TROPONIN T, HIGH SENSITIVITY - Normal    Troponin T, High Sensitivity <6     D DIMER QUANTITATIVE - Normal    D-Dimer Quantitative 0.40     CBC WITH PLATELETS AND DIFFERENTIAL    WBC Count 8.1      RBC Count 4.63      Hemoglobin 14.6      Hematocrit 42.5      MCV 92      MCH 31.5      MCHC 34.4      RDW 12.8      Platelet Count 224      % Neutrophils 63      % Lymphocytes 29      % Monocytes 6      % Eosinophils 1      % Basophils 0      % Immature Granulocytes 1      NRBCs per 100 WBC 0      Absolute Neutrophils 5.1      Absolute Lymphocytes 2.4      Absolute Monocytes 0.5      Absolute Eosinophils 0.1      Absolute Basophils 0.0      Absolute Immature Granulocytes 0.0      Absolute NRBCs 0.0     ROUTINE UA WITH MICROSCOPIC REFLEX TO CULTURE               Triage note reviewed:The pt presents tearful and hyperventilating in triage. \"I thinks its my acid reflux, I need something for the acid reflux. \" Pt unable to sit still on the chair. Pt states Im " having low back pain and chest pain. Pt states I have nausea and starts dry heaving in triage. Pt reports urinary frequency but  states that is not abnormal for her. Pt is tachycardic up to 150's in triage but when she calms down drops to 112. EKG ordered.     Triage Assessment (Adult)       Row Name 12/15/23 1629 12/15/23 1625       Triage Assessment    Airway WDL WDL WDL       Respiratory WDL    Respiratory WDL WDL WDL       Skin Circulation/Temperature WDL    Skin Circulation/Temperature WDL WDL WDL       Cardiac WDL    Cardiac WDL X;rhythm X    Pulse Rate & Regularity tachycardic --       Peripheral/Neurovascular WDL    Peripheral Neurovascular WDL WDL WDL       Cognitive/Neuro/Behavioral WDL    Cognitive/Neuro/Behavioral WDL WDL WDL                        History:  Supplemental history from: Family Member/Significant Other  External Record(s) reviewed: Primary care office visit note November 26 and September 4, 2022    Work Up:  Chart documentation includes differential considered and any EKGs or imaging independently interpreted by provider, where specified.  In additional to work up documented, I considered the following work up: X-ray, CTA of the chest, right upper quadrant ultrasound but deferred secondary to patient preference    External consultation:  Discussion of management with another provider: NA    Complicating factors:  Care impacted by chronic illness: Mental Health  Care affected by social determinants of health: Status to medical care    Disposition considerations: Discharge. I recommended the patient continue their current prescription strength medication(s): Increase omeprazole dose. See documentation for any additional details.    At the conclusion of the encounter I discussed the results of all of the tests and the disposition. The questions were answered. The patient or family acknowledged understanding and was agreeable with the care plan.    TOTAL CRITICAL CARE TIME (EXCLUDING  PROCEDURES): Not applicable    PROCEDURES:   None    EMERGENCY DEPARTMENT COURSE   6:24 PM I met with the patient to gather history and to perform my initial exam.  We discussed treatment options and the plan for care while in the Emergency Department.    ED INTERVENTIONS     Medications   ondansetron (ZOFRAN ODT) ODT tab 4 mg (4 mg Oral $Given 12/15/23 1707)   dimenhyDRINATE chew tab 50 mg (50 mg Oral Not Given 12/15/23 1705)   acetaminophen (TYLENOL) tablet 975 mg (975 mg Oral $Given 12/15/23 1705)   alum & mag hydroxide-simethicone (MAALOX) suspension 30 mL (30 mLs Oral $Given 12/15/23 1841)   lidocaine (viscous) (XYLOCAINE) 2 % solution 5 mL (5 mLs Mouth/Throat $Given 12/15/23 1841)       DISCHARGE MEDICATIONS        Review of your medicines        UNREVIEWED medicines. Ask your doctor about these medicines        Dose / Directions   hydrOXYzine HCl 25 MG tablet  Commonly known as: ATARAX      Dose: 25-50 mg  [HYDROXYZINE HCL (ATARAX) 25 MG TABLET] Take 25-50 mg by mouth.  Refills: 0     ibuprofen 600 MG tablet  Commonly known as: ADVIL/MOTRIN      Dose: 600 mg  [IBUPROFEN (ADVIL,MOTRIN) 600 MG TABLET] Take 600 mg by mouth every 6 (six) hours as needed for pain.  Refills: 0     lidocaine external gel  Commonly known as: XYLOCAINE      Apply topically 2 times daily as needed for moderate pain (4-6)  Quantity: 5 mL  Refills: 0     SUMAtriptan 25 MG tablet  Commonly known as: IMITREX      Dose: 25 mg  [SUMATRIPTAN (IMITREX) 25 MG TABLET] Take 25 mg by mouth every 2 (two) hours as needed for migraine.  Refills: 0                INFORMATION SOURCE AND LIMITATIONS    History/Exam limitations: None  Patient information was obtained from: Patient and friend  Use of : N/A    HISTORY OF PRESENT ILLNESS   Pilo Jaeger is a 33 year old year old female with a relevant past history of generalized anxiety disorder, heartburn, migraines, and MRSA, who presents to this ED by walk in for evaluation of abdominal pain,  "nausea, and vomiting.    The patient reports epigastric abdominal pain today with nausea. She states that the pain was so severe earlier today that she had trouble walking. Currently, the patient endorses some alleviation of pain, but she states that she still has stomach \"rumbling.\" She states that this episode triggered her anxiety and that she felt as though she could not breathe earlier today.     She reports loss of appetite recently due to stressors and states that she sometimes goes days without eating. The patient states that she has a history of similar pain, but it usually occurs twice a week and is much more mild. She states that she has been prescribed omeprazole, but it does not help. She did not take omeprazole today.    She denies vomiting. She denies smoking for the past 3 years.    Per chart review, the patient was seen at [facility] ED on 2023 (~11 months ago) for evaluation of rectal bleeding. The patient reported one month of intermittent bleeding. Patient had colonoscopy scheduled in 2 days. Rectal exam had findings of anal fissure. The patient was discharged in stable condition.       REVIEW OF SYSTEMS:   All other systems reviewed and are negative except as noted above in HPI.    PATIENT HISTORY   No past medical history on file.  Patient Active Problem List   Diagnosis    Generalized anxiety disorder    Migraine with aura    Heartburn during pregnancy in third trimester    MRSA (methicillin resistant staph aureus) culture positive     uterine contractions in third trimester, antepartum    Retained products of conception after miscarriage    Multigravida in third trimester    Tetrahydrocannabinol (THC) use disorder, mild, abuse    Tobacco use disorder    Migraines     Past Surgical History:   Procedure Laterality Date    BREAST EXCISIONAL BIOPSY Right        Allergies   Allergen Reactions    Codeine-Guaifenesin      Other reaction(s): *Unknown    Metronidazole      Other " "reaction(s): *Unknown    Penicillins Hives    Codeine Rash    Imidazole Antifungals Rash    Oxycodone Anxiety, Other (See Comments) and Rash    Quinolones Headache, Nausea and Rash       OUTPATIENT MEDICATIONS     Discharge Medication List as of 12/15/2023  7:47 PM         Vitals:    12/15/23 1621 12/15/23 1622 12/15/23 1625 12/15/23 1859   BP: (!) 169/129   116/71   Pulse: (!) 132 (!) 123  62   Resp: 24   18   Temp: 98.2  F (36.8  C)      TempSrc: Temporal      SpO2:  99%  98%   Weight:   68.5 kg (151 lb)    Height:   1.626 m (5' 4\")        Physical Exam   Constitutional: Oriented to person, place, and time. Appears well-developed and well-nourished.   HEENT:    Head: Atraumatic.   Neck: Normal range of motion. Neck supple.   Cardiovascular: Normal rate, regular rhythm and normal heart sounds.    Pulmonary/Chest: Normal effort  and breath sounds normal.   Abdominal: Soft. Bowel sounds are normal.   Musculoskeletal: Normal range of motion.   Neurological: Alert and oriented to person, place, and time. Normal strength. No sensory deficit. No cranial nerve deficit.  Skin: Skin is warm and dry.   Psychiatric: Normal mood and affect. Behavior is normal. Thought content normal.     DIAGNOSTICS    LABORATORY FINDINGS (REVIEWED AND INTERPRETED):  Labs Ordered and Resulted from Time of ED Arrival to Time of ED Departure   LIPASE - Abnormal       Result Value    Lipase 12 (*)    COMPREHENSIVE METABOLIC PANEL - Normal    Sodium 141      Potassium 3.7      Carbon Dioxide (CO2) 23      Anion Gap 14      Urea Nitrogen 13.6      Creatinine 0.83      GFR Estimate >90      Calcium 9.4      Chloride 104      Glucose 95      Alkaline Phosphatase 46      AST 18      ALT 17      Protein Total 7.6      Albumin 5.1      Bilirubin Total 0.8     TROPONIN T, HIGH SENSITIVITY - Normal    Troponin T, High Sensitivity <6     D DIMER QUANTITATIVE - Normal    D-Dimer Quantitative 0.40     CBC WITH PLATELETS AND DIFFERENTIAL    WBC Count 8.1      " RBC Count 4.63      Hemoglobin 14.6      Hematocrit 42.5      MCV 92      MCH 31.5      MCHC 34.4      RDW 12.8      Platelet Count 224      % Neutrophils 63      % Lymphocytes 29      % Monocytes 6      % Eosinophils 1      % Basophils 0      % Immature Granulocytes 1      NRBCs per 100 WBC 0      Absolute Neutrophils 5.1      Absolute Lymphocytes 2.4      Absolute Monocytes 0.5      Absolute Eosinophils 0.1      Absolute Basophils 0.0      Absolute Immature Granulocytes 0.0      Absolute NRBCs 0.0     ROUTINE UA WITH MICROSCOPIC REFLEX TO CULTURE         IMAGING (REVIEWED AND INTERPRETED):  No orders to display         ECG (REVIEWED AND INTERPRETED):   ECG:   Performed at: 16:39  HR:  117 bpm  Rhythm: Sinus  Axis: 95  QRS duration: 78 ms  QTC: 443 ms  ST changes: No ST segment elevation or depression, no T wave inversion,No Q wave  Interpretation: Sinus tachycardia with nonspecific ST-T wave changes  No prior for comparison    I have reviewed the patient's ECG, with comments made as listed above. Please see scanned image for full interpretation.       I, Kevan Alcala, am serving as a scribe to document services personally performed by Pierre Crowley D.O., based on my observation and the provider s statements to me.    I, Pierre Crowley D.O., attest that Kevan Alcala is acting in a scribe capacity, has observed my performance of the services and has documented them in accordance with my direction.    Pierre Crowley D.O.  EMERGENCY MEDICINE   12/15/23  [facility] EMERGENCY DEPARTMENT  [address] [place] [id number]  [phone]  Dept: [phone]     Pierre Crowley DO  12/16/23 0049

## 2023-12-16 NOTE — DISCHARGE INSTRUCTIONS
Increase omeprazole to twice daily.  Try to reestablish regular meals.  If you have recurrent chest pain not improved with either Maalox or use of over-the-counterTUMS turn to the emergency department.  Otherwise see your primary care physician in follow-up next week.

## 2023-12-25 LAB
ATRIAL RATE - MUSE: 117 BPM
DIASTOLIC BLOOD PRESSURE - MUSE: NORMAL MMHG
INTERPRETATION ECG - MUSE: NORMAL
P AXIS - MUSE: 82 DEGREES
PR INTERVAL - MUSE: 128 MS
QRS DURATION - MUSE: 78 MS
QT - MUSE: 318 MS
QTC - MUSE: 443 MS
R AXIS - MUSE: 95 DEGREES
SYSTOLIC BLOOD PRESSURE - MUSE: NORMAL MMHG
T AXIS - MUSE: 27 DEGREES
VENTRICULAR RATE- MUSE: 117 BPM

## 2024-07-29 ENCOUNTER — APPOINTMENT (OUTPATIENT)
Dept: ULTRASOUND IMAGING | Facility: HOSPITAL | Age: 34
End: 2024-07-29
Attending: STUDENT IN AN ORGANIZED HEALTH CARE EDUCATION/TRAINING PROGRAM
Payer: COMMERCIAL

## 2024-07-29 ENCOUNTER — HOSPITAL ENCOUNTER (EMERGENCY)
Facility: HOSPITAL | Age: 34
Discharge: HOME | End: 2024-07-30
Attending: EMERGENCY MEDICINE | Admitting: EMERGENCY MEDICINE
Payer: COMMERCIAL

## 2024-07-29 DIAGNOSIS — O26.859 SPOTTING IN PREGNANCY: ICD-10-CM

## 2024-07-29 LAB
ABO/RH(D): NORMAL
ANTIBODY SCREEN: NEGATIVE
HCG INTACT+B SERPL-ACNC: ABNORMAL MIU/ML
SPECIMEN EXPIRATION DATE: NORMAL

## 2024-07-29 PROCEDURE — 99284 EMERGENCY DEPT VISIT MOD MDM: CPT | Mod: 25

## 2024-07-29 PROCEDURE — 76801 OB US < 14 WKS SINGLE FETUS: CPT

## 2024-07-29 PROCEDURE — 84702 CHORIONIC GONADOTROPIN TEST: CPT | Performed by: STUDENT IN AN ORGANIZED HEALTH CARE EDUCATION/TRAINING PROGRAM

## 2024-07-29 PROCEDURE — 36415 COLL VENOUS BLD VENIPUNCTURE: CPT | Performed by: STUDENT IN AN ORGANIZED HEALTH CARE EDUCATION/TRAINING PROGRAM

## 2024-07-29 PROCEDURE — 84702 CHORIONIC GONADOTROPIN TEST: CPT | Performed by: EMERGENCY MEDICINE

## 2024-07-29 PROCEDURE — 86900 BLOOD TYPING SEROLOGIC ABO: CPT | Performed by: EMERGENCY MEDICINE

## 2024-07-29 PROCEDURE — 86900 BLOOD TYPING SEROLOGIC ABO: CPT | Performed by: STUDENT IN AN ORGANIZED HEALTH CARE EDUCATION/TRAINING PROGRAM

## 2024-07-30 VITALS
OXYGEN SATURATION: 99 % | HEART RATE: 64 BPM | BODY MASS INDEX: 21.85 KG/M2 | DIASTOLIC BLOOD PRESSURE: 57 MMHG | TEMPERATURE: 98.7 F | WEIGHT: 128 LBS | HEIGHT: 64 IN | SYSTOLIC BLOOD PRESSURE: 103 MMHG | RESPIRATION RATE: 18 BRPM

## 2024-07-30 RX ORDER — ONDANSETRON 4 MG/1
4 TABLET, ORALLY DISINTEGRATING ORAL EVERY 8 HOURS PRN
Qty: 20 TABLET | Refills: 0 | Status: SHIPPED | OUTPATIENT
Start: 2024-07-30 | End: 2024-08-06

## 2024-07-30 NOTE — ED TRIAGE NOTES
"Pt arrives to triage ambulatory w/ cousin endorsing recent pregnancy test was positive endorsing last few days bilateral pelvic pain along w/ some spotting that gradually went away and that has increased today along w/ additional spotting brownish red in color,  \"when I wipe not enough to need a pad\" per pt.      Hx of miscarriage @ approx 16 weeks.    "

## 2024-07-30 NOTE — ED PROVIDER NOTES
EMERGENCY DEPARTMENT ENCOUNTER      NAME: Pilo Jaeger  AGE: 34 year old female  YOB: 1990  MRN: [id number]  EVALUATION DATE & TIME: 2024 11:58 PM    ED PROVIDER: Desi Teran MD    Chief Complaint   Patient presents with    Pelvic Pain    Vaginal Bleeding       FINAL IMPRESSION  1. Spotting in pregnancy        MEDICAL DECISION MAKING   Pilo Jaeger is a 34 year old female who presents for evaluation of vaginal bleeding.  Patient reports history of 2 to 3 days of light spotting.  She reports that she had a positive pregnancy test about 2.5 weeks ago and had an ultrasound that showed she had a pregnancy but they were not able to identify a heartbeat.  She is scheduled for an appointment with someone in her OB clinic on .  She notes that she had similar symptoms with previous pregnancies and actually, states that she had a period throughout her entire pregnancy in the past.  She believes that her last menstrual period was sometime in the middle of .  Patient endorses nausea but notes that she struggled with this during previous pregnancies.  She has no associated abdominal pain, back pain, fever, chills, urinary symptoms, diarrhea, or constipation.    Outside records reviewed.  Patient has a history of anxiety, GERD, polysubstance abuse, miscarriage.      I considered a broad differential including but not limited to threatened , spontaneous , missed , ectopic pregnancy, placenta previa, placental abruption. Discussed options for workup and management with patient and agreed on plan for labs and US.     Rh+ so patient will not require RhoGAM.  hCG elevated at [address], consistent with pregnancy state.  Ultrasound revealed a single intrauterine gestation with EGA of 3/25/2023.  Fetal heart rate was not identified but unclear if this is related to early gestation or failed pregnancy.  No evidence of ectopic pregnancy or other acute process.    I updated the patient  with results.  We discussed potential etiology of spotting as well as ultrasound findings and recommendations for close follow-up.  She notes that she has an appointment with OB/GYN 2 days from now and encouraged her to keep this so they can repeat an ultrasound and hCG if felt to be necessary.  In the meantime, recommended that she continue using Tylenol as needed.  Patient requested a  prescription for antiemetic which she states was helpful with previous pregnancies.  Will send with a prescription for Zofran but I did encourage her to discuss other options with OB when she sees them later this week.    At the end of the encounter, we reviewed the results, potential diagnoses, as well as return precautions and recommendations for follow up. I instructed Ms. Jaeger to return to the emergency department immediately if she develops any new or worsening symptoms and provided additional verbal discharge instructions. Ms. Jaeger expressed understanding and agreement with this plan of care, her questions were answered, and she was discharged in stable condition.      Considerations in Medical Decision Making  History:  Supplemental history from: Documented in chart  External Record(s) reviewed: Documented in chart    Work Up:  Chart documentation includes differential considered and any EKGs or imaging independently interpreted by provider, where specified.  In additional to work up documented, I considered the following work up: Documented in chart, if applicable.    External consultation:  Discussion of management with another provider: Documented in chart, if applicable    Complicating factors:  Care impacted by chronic illness: N/A  Care affected by social determinants of health: Access to Medical Care, Alcohol Abuse and/or Recreational Drug Use, and No Support for Care at Home    Disposition considerations: Discharge. I prescribed additional prescription strength medication(s) as charted. See documentation for any  additional details.      ED COURSE  12:19 AM I met with the patient, obtained history, performed an initial exam, and discussed options and plan for diagnostics and treatment here in the ED.   12:31 AM We discussed the plan for discharge and the patient is agreeable. Reviewed supportive cares, symptomatic treatment, outpatient follow up, and reasons to return to the Emergency Department. All questions and concerns were addressed. Patient to be discharged by ED RN.      MEDICATIONS GIVEN IN THE ED  Medications - No data to display    NEW PRESCRIPTIONS STARTED AT TODAY'S VISIT  Discharge Medication List as of 7/30/2024 12:24 AM        START taking these medications    Details   ondansetron (ZOFRAN ODT) 4 MG ODT tab Take 1 tablet (4 mg) by mouth every 8 hours as needed for nausea, Disp-20 tablet, R-0, E-Prescribe                =================================================================    HPI:    Patient information was obtained from: patient     Use of : N/A      Pilo Jaeger is a 34 year old female who presents with vaginal bleeding in pregnancy.    The patient presents with a few days of mild vaginal spotting that is most present when she urinates in the morning and occasionally later in the day. About 2.5 weeks ago she had a positive pregnancy test and ultrasound but was unable to get an OB/GYN appointment with her OB until 8/8/2024. The ultrasound at that time showed fetal development at ~3.5 weeks. They scheduled an appointment with one of her OB's partner for 7/31 at 2:45 PM. She does have nausea but this is consistent with her previous pregnancies.She does note that she has recently been getting 2 periods per month with her most recent ones from 6/9-6/11 and again 6/15-6/20. She adds that during one of her pregnancies she had vaginal bleeding throughout. No need for pads with this bleeding. She denies lightheaded, fever, or any other complaints at this time.     SHx: Her mother recently  "passed away. She would like medications sent to the [facility] on [facility].       RELEVANT HISTORY, MEDICATIONS, & ALLERGIES   Past medical history, surgical history, family history, medications, and allergies reviewed and pertinent noted in HPI.    REVIEW OF SYSTEMS:  A complete review of systems was performed with pertinent positives and negatives noted in the HPI. All other systems negative.     PHYSICAL EXAM:    Vitals: /57   Pulse 64   Temp 98.7  F (37.1  C) (Oral)   Resp 18   Ht 1.626 m (5' 4\")   Wt 58.1 kg (128 lb)   SpO2 99%   BMI 21.97 kg/m     General: Alert and interactive, comfortable appearing.  HENT: Atraumatic. Full AROM of neck. MMM.  Cardiovascular: Regular rate.  Chest/Pulmonary: Normal work of breathing. Speaking in complete sentences. No chest wall tenderness or deformities.  Abdomen: Soft, nondistended. Nontender without guarding or rebound.  Extremities: Normal AROM of all major joints.  Skin: Warm and dry. Normal skin color.   Neuro: Speech clear. CNs grossly intact. Moves all extremities spontaneously.   Psych: Normal affect/mood, cooperative, memory appropriate.      LAB  Labs Ordered and Resulted from Time of ED Arrival to Time of ED Departure   HCG QUANTITATIVE PREGNANCY - Abnormal       Result Value    hCG Quantitative 10,025 (*)    TYPE AND SCREEN, ADULT    ABO/RH(D) A POS      Antibody Screen Negative      SPECIMEN EXPIRATION DATE [id number]     ABO/RH TYPE AND SCREEN       RADIOLOGY  US OB <14 Weeks w Transvaginal   Final Result   IMPRESSION:       1. Intrauterine gestation with estimated gestational age of 6 weeks, 1 day, and DOUGLAS of 3/23/2025.   2. Fetal heart rate not identified. This could be due to early gestational age although pregnancy failure not excluded. Follow-up serial beta hCG and/or ultrasound recommended as warranted clinically.       Findings Suspicious, But Not Diagnostic of Pregnancy Failure      CRL< 7mm and no FHR   MSD 16-24 mm and no embryo "   Absence of embryo with FHR 7-10 days after previous US showed a gestational sac with a yolk sac    Absence of embryo with FHR 7-13 days after previous US showed a gestational sac without a yolk sac   Absence of embryo >6 weeks after LMP   Enlarged yolk sac (>7mm)   Empty Amnion   Small gestational sac compared to embryo (MSD-CRL <5mm)      Reference: Diagnostic Criteria for Nonviable Pregnancy Early in the First Trimester. Ultrasound Quarterly; 30(1): 3-9, March 2014            I, Felipe Mills, am serving as a scribe to document services personally performed by Dr. Desi Teran based on my observation and the provider's statements to me. I, Desi Teran MD attest that Felipe Mills is acting in a scribe capacity, has observed my performance of the services and has documented them in accordance with my direction.    Desi Teran M.D.  Emergency Medicine  [facility] EMERGENCY DEPARTMENT  [facility] [facility] [id number]  [phone]  Dept: [phone]     Desi Teran MD  07/30/24 0756

## 2024-08-04 ENCOUNTER — HOSPITAL ENCOUNTER (EMERGENCY)
Facility: HOSPITAL | Age: 34
Discharge: LEFT BEFORE DISCHARGE | End: 2024-08-04
Attending: EMERGENCY MEDICINE | Admitting: EMERGENCY MEDICINE
Payer: COMMERCIAL

## 2024-08-04 VITALS
HEART RATE: 63 BPM | BODY MASS INDEX: 21.66 KG/M2 | RESPIRATION RATE: 16 BRPM | WEIGHT: 130 LBS | DIASTOLIC BLOOD PRESSURE: 56 MMHG | HEIGHT: 65 IN | TEMPERATURE: 97.8 F | SYSTOLIC BLOOD PRESSURE: 99 MMHG | OXYGEN SATURATION: 100 %

## 2024-08-04 PROCEDURE — 99281 EMR DPT VST MAYX REQ PHY/QHP: CPT

## 2024-08-04 ASSESSMENT — COLUMBIA-SUICIDE SEVERITY RATING SCALE - C-SSRS
2. HAVE YOU ACTUALLY HAD ANY THOUGHTS OF KILLING YOURSELF IN THE PAST MONTH?: NO
1. IN THE PAST MONTH, HAVE YOU WISHED YOU WERE DEAD OR WISHED YOU COULD GO TO SLEEP AND NOT WAKE UP?: NO
6. HAVE YOU EVER DONE ANYTHING, STARTED TO DO ANYTHING, OR PREPARED TO DO ANYTHING TO END YOUR LIFE?: NO

## 2024-08-04 NOTE — ED PROVIDER NOTES
EMERGENCY DEPARTMENT ENCOUNTER      NAME: Pilo Jaeger  AGE: 34 year old female  YOB: 1990  MRN: [id number]  EVALUATION DATE & TIME: No admission date for patient encounter.    PCP: [facility]    ED PROVIDER: Alyse Gudino M.D.      CHIEF COMPLAINT     Chief Complaint   Patient presents with    Vaginal Bleeding - Pregnant     ED COURSE   9:58 AM Called for patient, patient did not answer in triage.   10:18 AM Called again for patient in triage, again no answer.    Patient Left prior to my evaluation    FINAL IMPRESSION:   No diagnosis found.    Alyse Gudino M.D.  Emergency Medicine  [facility] EMERGENCY DEPARTMENT  [facility] [facility] [id number] [phone]  Dept: [phone]       Alyse Gudino MD  08/04/24 1456

## 2024-08-04 NOTE — ED TRIAGE NOTES
Patient is 7 weeks gestation. She notes spotting today. She also reports that she has been vomiting and has a headache.      Triage Assessment (Adult)       Row Name 08/04/24 0933          Triage Assessment    Airway WDL WDL        Respiratory WDL    Respiratory WDL WDL        Skin Circulation/Temperature WDL    Skin Circulation/Temperature WDL WDL        Cardiac WDL    Cardiac WDL WDL        Peripheral/Neurovascular WDL    Peripheral Neurovascular WDL WDL        Cognitive/Neuro/Behavioral WDL    Cognitive/Neuro/Behavioral WDL WDL